# Patient Record
Sex: MALE | Race: WHITE | Employment: FULL TIME | URBAN - NONMETROPOLITAN AREA
[De-identification: names, ages, dates, MRNs, and addresses within clinical notes are randomized per-mention and may not be internally consistent; named-entity substitution may affect disease eponyms.]

---

## 2019-04-26 ENCOUNTER — HOSPITAL ENCOUNTER (EMERGENCY)
Age: 44
Discharge: HOME | End: 2019-04-26
Attending: EMERGENCY MEDICINE
Payer: COMMERCIAL

## 2019-04-26 ENCOUNTER — APPOINTMENT (OUTPATIENT)
Dept: GENERAL RADIOLOGY | Age: 44
End: 2019-04-26
Payer: COMMERCIAL

## 2019-04-26 VITALS
RESPIRATION RATE: 18 BRPM | TEMPERATURE: 98 F | OXYGEN SATURATION: 99 % | HEIGHT: 74 IN | SYSTOLIC BLOOD PRESSURE: 148 MMHG | DIASTOLIC BLOOD PRESSURE: 78 MMHG | WEIGHT: 230 LBS | HEART RATE: 74 BPM | BODY MASS INDEX: 29.52 KG/M2

## 2019-04-26 DIAGNOSIS — S62.311A CLOSED DISPLACED FRACTURE OF BASE OF SECOND METACARPAL BONE OF LEFT HAND, INITIAL ENCOUNTER: Primary | ICD-10-CM

## 2019-04-26 PROCEDURE — 4500000023 HC ED LEVEL 3 PROCEDURE

## 2019-04-26 PROCEDURE — 73130 X-RAY EXAM OF HAND: CPT

## 2019-04-26 PROCEDURE — 99283 EMERGENCY DEPT VISIT LOW MDM: CPT

## 2019-04-26 RX ORDER — HYDROCODONE BITARTRATE AND ACETAMINOPHEN 5; 325 MG/1; MG/1
1 TABLET ORAL EVERY 6 HOURS PRN
Qty: 12 TABLET | Refills: 0 | Status: SHIPPED | OUTPATIENT
Start: 2019-04-26 | End: 2019-04-29

## 2019-04-26 ASSESSMENT — PAIN SCALES - GENERAL: PAINLEVEL_OUTOF10: 8

## 2019-04-26 ASSESSMENT — PAIN DESCRIPTION - LOCATION: LOCATION: HAND

## 2019-04-26 ASSESSMENT — PAIN DESCRIPTION - DESCRIPTORS: DESCRIPTORS: CONSTANT;THROBBING

## 2019-04-26 ASSESSMENT — ENCOUNTER SYMPTOMS
VOMITING: 0
NAUSEA: 0

## 2019-04-26 NOTE — LETTER
[facility]  Ariel[address]unt [address] [place] [address]  Phone: [phone]               April 26, 2019    Patient: Carlos Peabody   YOB: 1975   Date of Visit: 4/26/2019       To Whom It May Concern:    Shweta Armas was seen and treated in our emergency department on 4/26/2019.  He is on r handed work only until f/u with hand surgeon      Sincerely,       Jan Rayo MD         Signature:__________________________________

## 2019-04-26 NOTE — ED NOTES
Ice pack applied to site. Pt advised of current high ER pt acuity and volume. Pt verbalizes understanding.  Pt returned to [employer], [place]  04/26/19 [id number]

## 2019-04-26 NOTE — ED PROVIDER NOTES
Patient presents immersed part with plan of left hand pain. Patient states he tripped and accidentally punched the ground. He has pain over the proximal second metacarpal.  There is no wrist pain or shoulder pain. He sustained no other injuries. He has seen hand in the past for a ganglion cyst removal.            PAST MEDICAL HISTORY   has a past medical history of Depression. PAST SURGICAL HISTORY   has a past surgical history that includes Appendectomy and Wrist ganglion excision (Left). FAMILY HISTORY  family history is not on file. SOCIAL HISTORY   reports that he has never smoked. His smokeless tobacco use includes snuff and chew. He reports that he drank alcohol. HOME MEDICATIONS     Prior to Admission medications    Not on File        ALLERGIES  has No Known Allergies. Review of Systems   Constitutional: Negative for chills and fever. HENT: Negative for ear pain. Gastrointestinal: Negative for nausea and vomiting. Musculoskeletal: Positive for arthralgias. Physical Exam   Constitutional: He is oriented to person, place, and time. He appears well-developed. Non-toxic appearance. He does not have a sickly appearance. He does not appear ill. No distress. HENT:   Head: Normocephalic and atraumatic. Right Ear: External ear normal.   Mouth/Throat: Oropharynx is clear and moist.   Eyes: Pupils are equal, round, and reactive to light. Neck: Normal range of motion. Cardiovascular: Normal rate, regular rhythm, normal heart sounds and intact distal pulses. Exam reveals no gallop and no friction rub. No murmur heard. Pulmonary/Chest: Effort normal and breath sounds normal. No stridor. No respiratory distress. He has no wheezes. He has no rales. He exhibits no tenderness. Musculoskeletal: He exhibits edema and tenderness. Hands:  Neurological: He is alert and oriented to person, place, and time. He exhibits normal muscle tone. Skin: Skin is warm and dry.  He is

## 2019-04-26 NOTE — ED NOTES
Pt noted with localized edema and lt blue bruising to medial dorsal aspect of his L hand. Circ checks WNL. ROM slightly limited r/t c/o's pain.  Pt denies LOC or further c/o's     Antonella Murrell RN  04/26/19 [id number]

## 2019-04-30 ENCOUNTER — HOSPITAL ENCOUNTER (OUTPATIENT)
Dept: OCCUPATIONAL THERAPY | Age: 44
Setting detail: THERAPIES SERIES
Discharge: HOME | End: 2019-04-30
Payer: COMMERCIAL

## 2019-04-30 ENCOUNTER — OFFICE VISIT (OUTPATIENT)
Dept: ORTHOPEDIC SURGERY | Age: 44
End: 2019-04-30
Payer: COMMERCIAL

## 2019-04-30 VITALS — HEIGHT: 74 IN | WEIGHT: 229.94 LBS | BODY MASS INDEX: 29.51 KG/M2

## 2019-04-30 DIAGNOSIS — S62.311A CLOSED DISPLACED FRACTURE OF BASE OF SECOND METACARPAL BONE OF LEFT HAND, INITIAL ENCOUNTER: Primary | ICD-10-CM

## 2019-04-30 PROCEDURE — 26600 TREAT METACARPAL FRACTURE: CPT | Performed by: ORTHOPAEDIC SURGERY

## 2019-04-30 PROCEDURE — L3808 WHFO, RIGID W/O JOINTS: HCPCS | Performed by: OCCUPATIONAL THERAPIST

## 2019-04-30 PROCEDURE — 99213 OFFICE O/P EST LOW 20 MIN: CPT | Performed by: ORTHOPAEDIC SURGERY

## 2019-04-30 NOTE — PLAN OF CARE
OT/PT: the patient []has/ [x]has not received OT/PT previously for this diagnosis. Pain: 3-4/10     Objective Findings as appropriate:  ROM, strength, edema, wound/ scar appearance, function:  Moderate edema   Type of splint:  Radial gutter see above   Splint protocol utilization:   Full time   Splint Purpose: [x]Immobilize or protect [x]Promote healing of    [x]Relieve pain  [x]Provide support for improved hand function []Maximize joint motion    Treatment:   [x]Splint provided ([x]Customized/ []Prefabricated), and splint rationale explained. [x]Patient instructed in [x]wear/ [x]care of splint and educated regarding diagnosis. [x]Patient instructed in symptom reduction techniques   []HEP instruction    []Discussed ADL assistive device    Written Information Distributed: []HEP  [x]Splint care and wearing protocol    Patient response to evaluation and instructions:  [x]Attentive/interested   []Asked questions/ retained info  []Appeared disinterested  []Poor retention of information  []Appeared anxious/ fearful    Assessment and Plan:  Goals: [x]Patient will be able to verbalize rationale for, and demonstrate proper wearing     of splint. [x]Splint will provide proper fit and function. [x]Patient will be able to verbalize 2-3 ways to prevent further symptoms. []Patient will be able to don and doff independently. []Patient will be independent with HEP    Goals met:  [x]yes []no    Plan:  [x]Splint completed with good fit and function. Hand Therapy to follow up for     splint modifications as needed    []Splint completed; OT/PT evaluation initiated. Patient to return for further     treatment.     [address], OTR/L [id number]

## 2019-04-30 NOTE — PROGRESS NOTES
on file     Relationship status: Not on file    Intimate partner violence:     Fear of current or ex partner: Not on file     Emotionally abused: Not on file     Physically abused: Not on file     Forced sexual activity: Not on file   Other Topics Concern    Not on file   Social History Narrative    Not on file     No current outpatient medications on file. No current facility-administered medications for this visit. No Known Allergies    Review of Systems  Relevant review of systems reviewed and available in the patient's chart    Vital Signs  There were no vitals filed for this visit. General Exam:   Constitutional: Patient is adequately groomed with no evidence of malnutrition  Mental Status: The patient is oriented to time, place and person. The patient's mood and affect are appropriate. Lymphatic: The lymphatic examination bilaterally reveals all areas to be without enlargement or induration. Neurological: The patient has good coordination. There is no weakness or sensory deficit. Hand Examination - left  Inspection:  Moderate swelling radially. Palmar ecchymosis. No deformity. Skin intact    Palpation:  Tenderness along the index metacarpal base, soft    Range of Motion:  Full extension, stiffness with flexion, no scissoring, no malrotation    Strength:  Fingers sensate and warm. Motor movements present.  strength not tested with recent hand fracture    Special Tests:  No pain across the wrist joint    Skin: There are no additional worrisome rashes, ulcerations or lesions. Gait: normal    Circulation: well perfused    Additional Comments:     Additional Examinations:  Right Upper Extremity:  Examination of the left upper extremity does not show any tenderness, deformity or injury. Range of motion is unremarkable. There is no gross instability. There are no rashes, ulcerations or lesions.   Strength and tone are normal.       Radiology:     X-rays reviewed in office:  EXAMINATION:   3 XRAY VIEWS OF THE LEFT HAND       4/26/2019 3:10 pm       COMPARISON:   None.       HISTORY:   ORDERING SYSTEM PROVIDED HISTORY: injury   TECHNOLOGIST PROVIDED HISTORY:   Reason for exam:->injury   Ordering Physician Provided Reason for Exam: hand pain, fall   Acuity: Acute   Type of Exam: Initial       FINDINGS:   Intra-articular fracture involving base of the 2nd metacarpal with mild   displacement.  No additional acute fracture is identified. Tamra Solis is   otherwise no malalignment in the hand.           Impression   Acute traumatic fracture involving base of the 2nd metacarpal.           Assessment:  Left index finger metacarpal base fracture    Impression:   Encounter Diagnosis   Name Primary?  Closed displaced fracture of base of second metacarpal bone of left hand, initial encounter Yes       Office Procedures:  Orders Placed This Encounter   Procedures    [place] CLOSED RX METACARPAL FX       Treatment Plan:  Injury is well aligned, closed, no malrotation or scissoring on exam.  I would recommend nonsurgical measures. Hand therapy referral today for a forearm-based radial gutter MP blocker brace that incorporates the index and middle finger. Off for hygiene and gentle motion. Unable to drive for work due to safety. No lifting or heavy impact activities to prevent displacement. Okay for edema control and gentle motion. I would like to see him in 1 month with x-rays of the left hand. I anticipate a good outcome without the need for surgery    All questions and concerns were addressed today. Patient is in agreement with the plan. Robert Shaw MD  Hand & Upper Extremity Surgery  [address]  A partner of [facility] ([facility])        Please note that this transcription was created using voice recognition software. Any errors are unintentional and may be due to voice recognition transcription.

## 2019-05-28 ENCOUNTER — OFFICE VISIT (OUTPATIENT)
Dept: ORTHOPEDIC SURGERY | Age: 44
End: 2019-05-28

## 2019-05-28 VITALS — BODY MASS INDEX: 29.51 KG/M2 | HEIGHT: 74 IN | WEIGHT: 229.94 LBS

## 2019-05-28 DIAGNOSIS — M25.642 FINGER STIFFNESS, LEFT: ICD-10-CM

## 2019-05-28 DIAGNOSIS — S62.311A CLOSED DISPLACED FRACTURE OF BASE OF SECOND METACARPAL BONE OF LEFT HAND, INITIAL ENCOUNTER: Primary | ICD-10-CM

## 2019-05-28 PROCEDURE — 99024 POSTOP FOLLOW-UP VISIT: CPT | Performed by: ORTHOPAEDIC SURGERY

## 2019-05-28 RX ORDER — METHYLPREDNISOLONE 4 MG/1
TABLET ORAL
Qty: 1 KIT | Refills: 0 | Status: SHIPPED | OUTPATIENT
Start: 2019-05-28 | End: 2019-06-03

## 2019-05-28 NOTE — PROGRESS NOTES
Chief Complaint   Patient presents with    Hand Pain     WC CK LEFT DISPLACED 2ND METACARPAL FX       HISTORY OF PRESENT ILLNESS:  Gladys Sheehan is a 37 y.o.  patient here for repeat x-ray evaluation after sustaining a work related left hand injury, fracture of the index metacarpal base, injury approximately 1 month ago. Routine follow-up today. Patient feels that he is doing well. He would like to return to work. He still has some stiffness of the hand. He denies numbness. No new injury reported. He feels some  weakness. ROS:  ROS neg     Past medical history is reviewed again today, no changes to report    PHYSICAL EXAMINATION:  Patient is alert and pleasant, in no acute distress. The affected extremity is examined today. Left hand reveals nice alignment. There is still some mild residual radial swelling but the skin is intact. No breakdown or sign of infection from the brace. Palpation along the index metacarpal does not reveal instability or discomfort. There is full extension of all digits. No swan neck or boutonniere deformity. Flexion of the digits is to within 1 cm the palm without scissoring or crossover. Fingers are warm and sensate. No varus or valgus instability of the index finger appreciated    X-rays:  3 views, left hand, impression:  Well aligned and healing index metacarpal base fracture without any new displacement. There is good callus formation. IMPRESSION AND PLAN: Left index finger metacarpal base fracture    Doing well after sustaining a left hand injury, treated nonoperatively. X-rays reveal maintained alignment, patient is doing well clinically also. We will continue with our current care plan. Soft tissue massage, range of motion and stretching. Okay to wean out of the brace at this point and advance activities as symptoms allow. Okay to return to work Monday, as requested.   We did provide a range of motion and exercise pronounced for his hand to help regain his motion, resolve his stiffness. I would like to see him in 6 weeks with x-rays the left hand unless needed sooner. I anticipate full recovery. All questions and concerns were addressed today. Patient is in agreement with the plan. Rosalia Bartlett MD  Hand & Upper Extremity Surgery  [address]  A partner of [facility] ([facility])        Please note that this transcription was created using voice recognition software. Any errors are unintentional and may be due to voice recognition transcription.

## 2019-07-10 ENCOUNTER — OFFICE VISIT (OUTPATIENT)
Dept: ORTHOPEDIC SURGERY | Age: 44
End: 2019-07-10

## 2019-07-10 VITALS — WEIGHT: 229.94 LBS | HEIGHT: 74 IN | BODY MASS INDEX: 29.51 KG/M2

## 2019-07-10 DIAGNOSIS — S62.311A CLOSED DISPLACED FRACTURE OF BASE OF SECOND METACARPAL BONE OF LEFT HAND, INITIAL ENCOUNTER: Primary | ICD-10-CM

## 2019-07-10 PROCEDURE — 99024 POSTOP FOLLOW-UP VISIT: CPT | Performed by: ORTHOPAEDIC SURGERY

## 2019-07-10 NOTE — PROGRESS NOTES
Chief Complaint   Patient presents with    Follow-up     [facility] LEFT HAND: MC BASE FX, MDP LOV NON-OP, LIGHT DUTY, HAS PAIN STILL WITH USE SUCH HAS GRABBING AND PICKING UP ITEMS        HISTORY OF PRESENT ILLNESS:  Harish Chin is a 37 y.o.  patient here for repeat x-ray evaluation after sustaining a work-related left hand fracture just under 3 months ago and treated nonoperatively. Patient has some discomfort in the hand about the base of the index finger and the base of the thumb. Symptoms are aggravated with gripping and lifting activities. He denies clicking and locking. No numbness reported. He has fatigue in the left hand with gripping and hammering. He is working without restriction. Since last visit, patient tried a Medrol Dosepak to help regain motion and strength in the left hand. He does not feel much benefit from it. ROS:  ROS neg     Past medical history is reviewed again today, no changes to report    PHYSICAL EXAMINATION:  Patient is alert and pleasant, in no acute distress. The affected extremity is examined today. Left hand reveals good alignment, there is a mild residual swelling. On flexion there is no scissoring or crossover. Index finger flexes to the palm but he is unable to make a full tight fist yet. There is some discomfort with palpation about the index metacarpal base but no instability. Mild  weakness. Thumb is warm and sensate, index is warm and sensate. No snapping of the tendons noted today. X-rays:  3 views, left hand, impression:    Well aligned index metacarpal base fracture, callus formation is evident. CMC joint appears well aligned. No new deformity appreciated    IMPRESSION AND PLAN: Left index metacarpal base fracture, left hand pain  We will proceed with left hand MRI at this point, rule out index metacarpal nonunion or delayed union, rule out volar beak ligament rupture or other internal derangement. He would like to continue working.   He will

## 2019-07-29 DIAGNOSIS — S62.339A: Primary | ICD-10-CM

## 2019-07-29 RX ORDER — DIAZEPAM 5 MG/1
10 TABLET ORAL ONCE
Qty: 2 TABLET | Refills: 0 | Status: SHIPPED | OUTPATIENT
Start: 2019-07-29 | End: 2019-07-29

## 2019-08-21 ENCOUNTER — OFFICE VISIT (OUTPATIENT)
Dept: ORTHOPEDIC SURGERY | Age: 44
End: 2019-08-21
Payer: COMMERCIAL

## 2019-08-21 VITALS — WEIGHT: 229.94 LBS | HEIGHT: 74 IN | BODY MASS INDEX: 29.51 KG/M2

## 2019-08-21 DIAGNOSIS — S62.311K: ICD-10-CM

## 2019-08-21 DIAGNOSIS — S62.339A: Primary | ICD-10-CM

## 2019-08-21 PROCEDURE — 99213 OFFICE O/P EST LOW 20 MIN: CPT | Performed by: ORTHOPAEDIC SURGERY

## 2019-08-21 NOTE — LETTER
[address]  Phone: [phone]  Fax: [phone]    Nazario Salter MD        August 21, 2019     Patient: Isidoro Gottlieb   YOB: 1975   Date of Visit: 8/21/2019       To Whom it May Concern:    Benigno Boone was seen in my clinic on 8/21/2019. If you have any questions or concerns, please don't hesitate to call.     Sincerely,       MD Aviva Gan MD

## 2019-08-21 NOTE — PROGRESS NOTES
addition I have recommended open reduction and internal fixation left index finger metacarpal with distal radius autogenous bone graft as an outpatient procedure for fixation of the nonunion. We would also utilize the bone stimulator postsurgically. Surgical procedure along with time to recovery as outlined. There would be at least 4 weeks off of work. The risks and benefits of surgical fixation versus non-operative management were discussed thoroughly. These included, but were not limited to infection, tendon or nerve injury, stiffness or pain of the hand or fingers long-term, malunion, nonunion, malrotation, implant failure, tendon rupture, scar sensitivity, adverse effects of anesthesia (stroke or death), and possible need for hardware removal or further surgery such as tenolysis. Patient would like to discuss with his wife and his place of work and get back to us. All questions and concerns were addressed today. Patient is in agreement with the plan.         Priyanka Ko MD  Hand & Upper Extremity Surgery  [address] [facility] ([facility])

## 2019-08-22 ENCOUNTER — TELEPHONE (OUTPATIENT)
Dept: ORTHOPEDIC SURGERY | Age: 44
End: 2019-08-22

## 2019-08-23 ENCOUNTER — TELEPHONE (OUTPATIENT)
Dept: ORTHOPEDIC SURGERY | Age: 44
End: 2019-08-23

## 2019-09-13 ENCOUNTER — TELEPHONE (OUTPATIENT)
Dept: ORTHOPEDIC SURGERY | Age: 44
End: 2019-09-13

## 2019-09-16 ENCOUNTER — TELEPHONE (OUTPATIENT)
Dept: ORTHOPEDIC SURGERY | Age: 44
End: 2019-09-16

## 2019-10-03 ENCOUNTER — TELEPHONE (OUTPATIENT)
Dept: ORTHOPEDIC SURGERY | Age: 44
End: 2019-10-03

## 2019-10-07 ENCOUNTER — TELEPHONE (OUTPATIENT)
Dept: ORTHOPEDIC SURGERY | Age: 44
End: 2019-10-07

## 2019-10-16 ENCOUNTER — TELEPHONE (OUTPATIENT)
Dept: ORTHOPEDIC SURGERY | Age: 44
End: 2019-10-16

## 2019-12-18 ENCOUNTER — TELEPHONE (OUTPATIENT)
Dept: ORTHOPEDIC SURGERY | Age: 44
End: 2019-12-18

## 2019-12-27 ENCOUNTER — OFFICE VISIT (OUTPATIENT)
Dept: ORTHOPEDIC SURGERY | Age: 44
End: 2019-12-27
Payer: COMMERCIAL

## 2019-12-27 VITALS — BODY MASS INDEX: 30.81 KG/M2 | WEIGHT: 240.08 LBS | HEIGHT: 74 IN

## 2019-12-27 DIAGNOSIS — S62.311K: ICD-10-CM

## 2019-12-27 DIAGNOSIS — M79.642 HAND PAIN, LEFT: Primary | ICD-10-CM

## 2019-12-27 PROCEDURE — 99213 OFFICE O/P EST LOW 20 MIN: CPT | Performed by: ORTHOPAEDIC SURGERY
